# Patient Record
Sex: MALE | Race: WHITE
[De-identification: names, ages, dates, MRNs, and addresses within clinical notes are randomized per-mention and may not be internally consistent; named-entity substitution may affect disease eponyms.]

---

## 2017-02-23 NOTE — P.OP
Date of Procedure: 02/23/17


Preoperative Diagnosis: 


Status post a partial right nasectomy for cancer, absence of right side of nose


Postoperative Diagnosis: 


Same


Procedure(s) Performed: 


Nasal reconstruction with use of a paramedian forehead flap with upper lateral 

cartilaginous graft with complex closure of the donor site.


Anesthesia: GETA


Surgeon: Renato Quiroz


Estimated Blood Loss (ml): 50


Pathology: other (Nasal margin)


Condition: stable


Disposition: PACU


Indications for Procedure: 


This patient had a infiltrative basal cell carcinoma.  Initial removal showed 

perineural invasion.  Was sent to a Mohs surgeon for completion of removal.  

Reconstruction is planned.  All risks, benefits, and alternative therapies were 

discussed in detail.  Consent was obtained and all questions were answered.


Operative Findings: 


Patient had a very large right nasal defect including the right side of the 

nose columella and alar rim.


Description of Procedure: 


This patient was taken to the operative room and placed in the supine position.

  A general inhalation anesthetic was administered to the patient by mask and 

subsequently intubated with a cuffed endotracheal tube by the department of 

anesthesia with a functioning IV line in place.  Patient was monitored 

throughout the entire case by the department of anesthesia.  The face and nose 

was sterilely prepped and draped in usual fashion.  We identified a very large 

right nasal defect where the entire right side of the nose was missing.  We've 

injected the nose with lidocaine 1% with epinephrine 1 100,000 did markings.  

We with the use of a dermoid blade freshen the margins to accept the graft.  We 

then did measurements and developed a template for nasal reconstruction.  The 

nasal reconstruction was planned with a paramedian forehead flap.  We utilized 

a Doppler to identify the supratrochlear artery.  We marked this area.  We 

harvested cartilage from the septum and use that as an upper lateral cartilage 

insert.  We elected to hold off on a rim caudal edge of this graft until after 

this is healed.  We then measured the forehead and scalp flap and used to 

template which extended into the scalp.  We developed the paramedian flap with 

use of a 15 blade delicate plastic scissors and a Brown-Adson forceps and we 

rotated the paramedian flap into position and measured this appropriately.  We 

had good blood flow and utilized a Doppler to make sure the pedicle flap was 

viable.  We did extensive undermining of the donor site and we close the donor 

site in a complex fashion utilizing 2-0 Vicryl deeply 4-0 Monocryl in the deep 

dermal layer and the skin was closed with use of a 5-0 nylon.  After closure of 

this donor site in a complex fashion we rotated the pedicle flap into position 

we thinned out the lower portion and rotated the lower portion upon itself to 

form the LR rim.  We sutured the flap into position utilizing 4-0 Monocryl 

deeply.  We then closed the skin with a 4-0 nylon in an interrupted type 

fashion.  Excellent approximation was obtained we obtained an excellent 

cosmetic result with regard LR rim reconstruction.  We utilized the cartilage 

that we previously harvested for the upper lateral cartilage for form and 

function.  The patient tolerated this well and follow-up will be in the office 

in 1 week.

## 2017-06-01 NOTE — P.OP
Date of Procedure: 06/01/17


Preoperative Diagnosis: 


Forehead and nose pedicle flap for nasal reconstruction after cancer removal.


Postoperative Diagnosis: 


Same


Procedure(s) Performed: 


Excision of a forehead topedicle flap with a complex closure of a 2.6 x 2 cm 

forehead defect to complex closure of a nasal defect measuring 2.7 x 1 cm with 

a debulking of the right alar rim with a right auricular cartilage graft and a 

complex closure of a right auricular donor site measuring 2.6 x 1 cm


Implants: 





Anesthesia: JEFFA


Surgeon: Renato Quiroz


Estimated Blood Loss (ml): 10


Pathology: none sent


Condition: stable


Disposition: PACU


Indications for Procedure: 


Patient is a very large right nasal cancer and underwent reconstruction with 

use of a forehead flap.  He seen today for pedicle takedown.  We are also 

planning on a right auricular cartilage graft to reconstruct the right alar rim 

along with a debulking.


Operative Findings: 


Patient had a existing pedicle flap that was taken down and the right nasal 

alar rim incision underwent reconstruction.


Description of Procedure: 


This patient was taken to the operative room and placed in the supine position.

  A general inhalation anesthetic was administered to the patient by mask and 

subsequently intubated with a cuffed endotracheal tube by the department of 

anesthesia with a functioning IV line in place.  The patient was monitored 

throughout the entire case by the department of anesthesia.  The face and right 

ear and neck were sterilely prepped and draped in usual fashion.  We marked the 

pedicle flap both on the forehead and around the nose along with a rim incision 

to the right maye bowl.  We anesthetize these areas with lidocaine 1% with 

epinephrine 1 100,000 and Marcaine.  After 10 minutes were allowed wait for 

full vasoconstrictive effects to take place an incision was made in the 

forehead and the pedicle flap was  from the forehead leaving a large 

defect.  We excised this defect and close this after extensive undermining in 

all directions we did undermining in all directions widely removed redundant 

skin and close this in a complex fashion the incision measured 2.6 x 2 cm.  We 

closed this with the 4-0 Monocryl in the deep subcutaneous tissue 4-0 Monocryl 

in the deep dermal layer 4-0 Monocryl and the mid dermal layer and the skin was 

closed with a 5-0 Prolene.  The pedicle flap was then excised from the nose 

leaving a large defect measuring 2.7 x 1 cm.  This defect underwent a debulking 

with removal of redundant skin we did extensive undermining in all directions 

and we repaired this defect with complex closure utilizing a 4-0 Monocryl in 

the deep subcutaneous fashion for Monocryl and the mid subcutaneous fashion and 

the skin was closed with a 5-0 Prolene.  After the nasal defect was excised we 

made an incision along the right alar rim and debulked the right alar rim.  We 

then made an incision in the auricular maye bowl and harvested the skin from 

the maye bowl.  We closed the donor site in a complex fashion with extensive 

undermining and we utilized a 4-0 Monocryl the deep subcutaneous tissue 4-0 

Monocryl in the deep dermal layer and the skin was closed with a 50 rapid 

Vicryl in a running nonlocking fashion that incision and defect site measured 

2.6 x 1 cm.  After the donor site was closed in a complex fashion the cartilage 

was cut to size and placed as a alar rim graft.  We sutured it into place.  

Again we did a debulking of the right alar rim which was quite bulky.  We then 

that rim and we close that defect in a complex fashion again with a 4-0 

Monocryl deeply and a 5-0 Prolene after we did undermining and a graft 

placement and we secured the graft with a 4-0 Monocryl.  Excellent 

approximation was obtained excellent results were obtained the nasal defects 

looked good.  To summarize we did an excision of a forehead and nose pedicle 

flap with complex closure with a defect measuring 2.6 x 2 cm of the forehead we 

then did a complex closure of a nasal defect measuring 2.1 x 1 cm with a 

debulking of the right alar rim is a right auricular cartilage graft and 

complex closure of the right auricular donor site measuring 2.6 x 1 cm.  The 

patient tolerated this well.  Patient will be discharged with nitroglycerin 

ointment Norco antibiotics and a follow-up is scheduled for 1 week.  The 

patient did have a Merocel sponge pack placed and he will remove that in 1 day.

## 2018-07-03 ENCOUNTER — HOSPITAL ENCOUNTER (INPATIENT)
Dept: HOSPITAL 47 - EC | Age: 43
LOS: 1 days | Discharge: LEFT BEFORE DISCHARGE | DRG: 872 | End: 2018-07-04
Attending: INTERNAL MEDICINE | Admitting: INTERNAL MEDICINE
Payer: COMMERCIAL

## 2018-07-03 VITALS — TEMPERATURE: 99.7 F

## 2018-07-03 DIAGNOSIS — M19.90: ICD-10-CM

## 2018-07-03 DIAGNOSIS — I10: ICD-10-CM

## 2018-07-03 DIAGNOSIS — F17.200: ICD-10-CM

## 2018-07-03 DIAGNOSIS — A41.9: Primary | ICD-10-CM

## 2018-07-03 DIAGNOSIS — K21.9: ICD-10-CM

## 2018-07-03 DIAGNOSIS — K57.32: ICD-10-CM

## 2018-07-03 DIAGNOSIS — Z85.828: ICD-10-CM

## 2018-07-03 DIAGNOSIS — F41.9: ICD-10-CM

## 2018-07-03 DIAGNOSIS — Z90.49: ICD-10-CM

## 2018-07-03 LAB
ALBUMIN SERPL-MCNC: 4.4 G/DL (ref 3.5–5)
ALP SERPL-CCNC: 114 U/L (ref 38–126)
ALT SERPL-CCNC: 65 U/L (ref 21–72)
AMYLASE SERPL-CCNC: 60 U/L (ref 30–110)
ANION GAP SERPL CALC-SCNC: 15 MMOL/L
AST SERPL-CCNC: 60 U/L (ref 17–59)
BASOPHILS # BLD AUTO: 0.1 K/UL (ref 0–0.2)
BASOPHILS NFR BLD AUTO: 1 %
BUN SERPL-SCNC: 7 MG/DL (ref 9–20)
CALCIUM SPEC-MCNC: 9.5 MG/DL (ref 8.4–10.2)
CHLORIDE SERPL-SCNC: 102 MMOL/L (ref 98–107)
CO2 SERPL-SCNC: 24 MMOL/L (ref 22–30)
EOSINOPHIL # BLD AUTO: 0.1 K/UL (ref 0–0.7)
EOSINOPHIL NFR BLD AUTO: 0 %
ERYTHROCYTE [DISTWIDTH] IN BLOOD BY AUTOMATED COUNT: 4.51 M/UL (ref 4.3–5.9)
ERYTHROCYTE [DISTWIDTH] IN BLOOD: 12.5 % (ref 11.5–15.5)
GGT SERPL-CCNC: 314 U/L (ref 15–73)
GLUCOSE SERPL-MCNC: 109 MG/DL (ref 74–99)
HCT VFR BLD AUTO: 43.2 % (ref 39–53)
HGB BLD-MCNC: 14.9 GM/DL (ref 13–17.5)
LIPASE SERPL-CCNC: 91 U/L (ref 23–300)
LYMPHOCYTES # SPEC AUTO: 1.6 K/UL (ref 1–4.8)
LYMPHOCYTES NFR SPEC AUTO: 11 %
MCH RBC QN AUTO: 33 PG (ref 25–35)
MCHC RBC AUTO-ENTMCNC: 34.5 G/DL (ref 31–37)
MCV RBC AUTO: 95.6 FL (ref 80–100)
MONOCYTES # BLD AUTO: 0.9 K/UL (ref 0–1)
MONOCYTES NFR BLD AUTO: 6 %
NEUTROPHILS # BLD AUTO: 11.9 K/UL (ref 1.3–7.7)
NEUTROPHILS NFR BLD AUTO: 81 %
PLATELET # BLD AUTO: 242 K/UL (ref 150–450)
POTASSIUM SERPL-SCNC: 3.5 MMOL/L (ref 3.5–5.1)
PROT SERPL-MCNC: 7.4 G/DL (ref 6.3–8.2)
SODIUM SERPL-SCNC: 141 MMOL/L (ref 137–145)
WBC # BLD AUTO: 14.8 K/UL (ref 3.8–10.6)

## 2018-07-03 PROCEDURE — 83690 ASSAY OF LIPASE: CPT

## 2018-07-03 PROCEDURE — 74177 CT ABD & PELVIS W/CONTRAST: CPT

## 2018-07-03 PROCEDURE — 85025 COMPLETE CBC W/AUTO DIFF WBC: CPT

## 2018-07-03 PROCEDURE — 83605 ASSAY OF LACTIC ACID: CPT

## 2018-07-03 PROCEDURE — 82977 ASSAY OF GGT: CPT

## 2018-07-03 PROCEDURE — 81003 URINALYSIS AUTO W/O SCOPE: CPT

## 2018-07-03 PROCEDURE — 36415 COLL VENOUS BLD VENIPUNCTURE: CPT

## 2018-07-03 PROCEDURE — 80053 COMPREHEN METABOLIC PANEL: CPT

## 2018-07-03 PROCEDURE — 99285 EMERGENCY DEPT VISIT HI MDM: CPT

## 2018-07-03 PROCEDURE — 82150 ASSAY OF AMYLASE: CPT

## 2018-07-03 PROCEDURE — 87040 BLOOD CULTURE FOR BACTERIA: CPT

## 2018-07-03 RX ADMIN — DEXTROSE MONOHYDRATE STA MLS/HR: 5 INJECTION, SOLUTION INTRAVENOUS at 19:57

## 2018-07-03 RX ADMIN — HYDROCODONE BITARTRATE AND ACETAMINOPHEN PRN EACH: 5; 325 TABLET ORAL at 22:11

## 2018-07-03 RX ADMIN — HEPARIN SODIUM SCH UNIT: 5000 INJECTION, SOLUTION INTRAVENOUS; SUBCUTANEOUS at 23:06

## 2018-07-03 RX ADMIN — DEXTROSE MONOHYDRATE STA: 5 INJECTION, SOLUTION INTRAVENOUS at 19:40

## 2018-07-03 RX ADMIN — METRONIDAZOLE SCH MLS/HR: 500 INJECTION, SOLUTION INTRAVENOUS at 23:06

## 2018-07-03 RX ADMIN — HYDROCODONE BITARTRATE AND ACETAMINOPHEN PRN EACH: 5; 325 TABLET ORAL at 18:07

## 2018-07-03 RX ADMIN — MORPHINE SULFATE PRN MG: 2 INJECTION, SOLUTION INTRAMUSCULAR; INTRAVENOUS at 23:02

## 2018-07-03 NOTE — CT
EXAMINATION TYPE: CT abdomen pelvis w con

 

DATE OF EXAM: 7/3/2018

 

COMPARISON: NONE

 

HISTORY: LLQ pain, diarrhea, vomiting

 

CT DLP: 1283 mGycm

Automated exposure control for dose reduction was used.

 

CONTRAST: 

CT scan of the abdomen pelvis is performed with IV Contrast, patient injected with 100 mL of Isovue 3
00.

 

FINDINGS- 

LUNG BASES-  No significant abnormality is appreciated. 

 

LIVER/GB-surgical clips in the gallbladder fossa noted. Liver appears to be prominent in size measuri
ng 22 cm there is findings suggestive of hepatic steatosis. Mild central biliary prominence of the du
ct. Postcholecystectomy changes seen. 

 

PANCREAS-  No gross abnormality is seen. 

 

SPLEEN-  No gross abnormality is seen. 

 

ADRENALS-  No gross abnormality is seen.

 

KIDNEYS/BLADDER- no hydronephrosis nephrolithiasis or renal mass.

   

BOWEL-there is diffuse bowel wall thickening involving the left colon with diverticula in the region 
is small amount of free fluid. No diagnostic evidence of free air.

LYMPH NODES-  No greater than 1cm abdominal or pelvic lymph nodes areappreciated. 

 

OSSEOUS STRUCTURES-  No significant abnormality is seen. 

 

OTHER-  aorta of normal caliber. 

 

IMPRESSION- 

1. Acute diverticulitis with a small amount of adjacent free fluid but no evidence of free air or abs
cess.

## 2018-07-03 NOTE — ED
General Adult HPI





- General


Chief complaint: Abdominal Pain


Stated complaint: LUQ Pain


Time Seen by Provider: 07/03/18 11:30


Source: patient, RN notes reviewed


Mode of arrival: ambulatory


Limitations: no limitations





- History of Present Illness


Initial comments: 





This is a 42-year-old male who presents emergency Department complaining of 

left lower quadrant pain for last 2 or 3 days.  Patient states he has vomited 

and was nauseous earlier but is no longer nauseated.  Patient denies any 

diarrhea.  Patient states the pain is limited to the left lower quadrant.  

Patient denies any other areas of abdominal pain.  Patient denies any chest 

pain or difficulty breathing.  Patient denies any headache patient denies 

numbness weakness.  Patient denies any recent fever chills.  Patient denies any 

back pain.  Patient denies dysuria hematuria urinary frequency.





- Related Data


 Home Medications











 Medication  Instructions  Recorded  Confirmed


 


No Known Home Medications  07/03/18 07/03/18











 Allergies











Allergy/AdvReac Type Severity Reaction Status Date / Time


 


No Known Allergies Allergy   Verified 07/03/18 12:50














Review of Systems


ROS Statement: 


Those systems with pertinent positive or pertinent negative responses have been 

documented in the HPI.





ROS Other: All systems not noted in ROS Statement are negative.





Past Medical History


Past Medical History: Cancer, GERD/Reflux, Hypertension


Additional Past Medical History / Comment(s): skin cancer on nose , 

diverticulitis,


History of Any Multi-Drug Resistant Organisms: None Reported


Past Surgical History: Cholecystectomy, Orthopedic Surgery


Additional Past Surgical History / Comment(s): LEFT WRIST surgery, "surgery to 

remove cyst from my stomach", nasal surgery


Past Anesthesia/Blood Transfusion Reactions: No Reported Reaction


Past Psychological History: Anxiety


Smoking Status: Current every day smoker


Past Alcohol Use History: None Reported


Past Drug Use History: None Reported





- Past Family History


  ** Mother


Family Medical History: No Reported History





General Exam





- General Exam Comments


Initial Comments: 





GENERAL:


Patient is well-developed and well-nourished.  Patient is nontoxic and well-

hydrated and is in mild distress.





ENT:


Neck is soft and supple.  No significant lymphadenopathy is noted.  Oropharynx 

is clear.  Moist mucous membranes.  Neck has full range of motion without 

eliciting any pain.





EYES:


The sclera were anicteric and conjunctiva were pink and moist.  Extraocular 

movements were intact and pupils were equal round and reactive to light.  

Eyelids were unremarkable.





PULMONARY:


Unlabored respirations.  Good breath sounds bilaterally.  No audible rales 

rhonchi or wheezing was noted.





CARDIOVASCULAR:


There is a regular rate and rhythm without any murmurs gallops or rubs.  





ABDOMEN:


Left lower quadrant pain point tenderness no rebound.  No palpable organomegaly 

was noted.  There is no palpable pulsatile mass.





SKIN:


Skin is clear with no lesions or rashes and otherwise unremarkable.





NEUROLOGIC:


Patient is alert and oriented x3.  Cranial nerves II through XII are grossly 

intact.  Motor and sensory are also intact.  Normal speech, volume and content.

  Symmetrical smile. 





MUSCULOSKELETAL:


Normal extremities with adequate strength and full range of motion.  No lower 

extremity swelling or edema.  No calf tenderness.





LYMPHATICS:


No significant lymphadenopathy is noted





PSYCHIATRIC:


Normal psychiatric evaluation.  Normal interpersonal interactions appears 

functionally intact in deals appropriately with others.  No signs of 

depression.  No signs of anxiety.  


Limitations: no limitations





Course


 Vital Signs











  07/03/18





  12:15


 


Temperature 100.3 F H


 


Pulse Rate 127 H


 


Respiratory 16





Rate 


 


Blood Pressure 200/118


 


O2 Sat by Pulse 97





Oximetry 














Medical Decision Making





- Medical Decision Making





Computed tomography scan shows acute diverticulitis with some surrounding free 

fluid no free air no abscess.





I spoke with Dr. Gonzalez he accepted the patient admitted the patient wrote 

admitting orders I started antibiotics and continue antibiotics on the floor.





- Lab Data


Result diagrams: 


 07/03/18 13:50





 07/03/18 13:50


 Lab Results











  07/03/18 07/03/18 07/03/18 Range/Units





  13:50 13:50 13:50 


 


WBC  14.8 H    (3.8-10.6)  k/uL


 


RBC  4.51    (4.30-5.90)  m/uL


 


Hgb  14.9    (13.0-17.5)  gm/dL


 


Hct  43.2    (39.0-53.0)  %


 


MCV  95.6    (80.0-100.0)  fL


 


MCH  33.0    (25.0-35.0)  pg


 


MCHC  34.5    (31.0-37.0)  g/dL


 


RDW  12.5    (11.5-15.5)  %


 


Plt Count  242    (150-450)  k/uL


 


Neutrophils %  81    %


 


Lymphocytes %  11    %


 


Monocytes %  6    %


 


Eosinophils %  0    %


 


Basophils %  1    %


 


Neutrophils #  11.9 H    (1.3-7.7)  k/uL


 


Lymphocytes #  1.6    (1.0-4.8)  k/uL


 


Monocytes #  0.9    (0-1.0)  k/uL


 


Eosinophils #  0.1    (0-0.7)  k/uL


 


Basophils #  0.1    (0-0.2)  k/uL


 


Sodium   141   (137-145)  mmol/L


 


Potassium   3.5   (3.5-5.1)  mmol/L


 


Chloride   102   ()  mmol/L


 


Carbon Dioxide   24   (22-30)  mmol/L


 


Anion Gap   15   mmol/L


 


BUN   7 L   (9-20)  mg/dL


 


Creatinine   0.80   (0.66-1.25)  mg/dL


 


Est GFR (CKD-EPI)AfAm   >90   (>60 ml/min/1.73 sqM)  


 


Est GFR (CKD-EPI)NonAf   >90   (>60 ml/min/1.73 sqM)  


 


Glucose   109 H   (74-99)  mg/dL


 


Plasma Lactic Acid Nelson    1.4  (0.7-2.0)  mmol/L


 


Calcium   9.5   (8.4-10.2)  mg/dL


 


Total Bilirubin   1.1   (0.2-1.3)  mg/dL


 


GGT   314 H   (15-73)  U/L


 


AST   60 H   (17-59)  U/L


 


ALT   65   (21-72)  U/L


 


Alkaline Phosphatase   114   ()  U/L


 


Total Protein   7.4   (6.3-8.2)  g/dL


 


Albumin   4.4   (3.5-5.0)  g/dL


 


Amylase   60   ()  U/L


 


Lipase   91   ()  U/L














Disposition


Clinical Impression: 


 Diverticulitis





Disposition: ADMITTED AS IP TO THIS [facility]


Referrals: 


None,Stated [Primary Care Provider] - 1-2 days


Time of Disposition: 15:29

## 2018-07-03 NOTE — P.HPIM
History of Present Illness


H&P Date: 07/03/18


Chief Complaint: Abdominal pain





Patient is a 42-year-old male with a known history of hypertension, GERD, 

osteoarthritis and previous history of diagnostic laparoscopic drainage of 

abscess due to perforated diverticula came to ER with complaints of left lower 

quadrant pain for last 2 or 3 days.  Patient states he has vomited and was 

nauseous earlier but is no longer nauseated.  Last night he also felt cold 

sweats.  Patient denies any diarrhea.  Patient states the pain is limited to 

the left lower quadrant.  Patient denies any other areas of abdominal pain.  

Patient denies any chest pain or difficulty breathing.  Patient denies any 

headache patient denies numbness weakness.  Patient denies any recent fever 

chills.  Patient denies any back pain.  Patient denies dysuria hematuria 

urinary frequency.





CT chest showed acute diverticulitis with small amount of adjacent free fluid 

but no evidence of free air or abscess.





Review of Systems





Constitutional: Patient denies any fever or chills .  No generalized weakness 

or weight loss.  


Abdomen: Patient does have left lower quadrant abdominal pain along with nausea 

and 1 episode of vomiting.  No diarrhea.


Cardiovascular: Patient denies any chest pain or short of breath no 

palpitations.


Respiratory: patient denied any cough is from production.  No shortness of 

breath


Neurologic: Patient denied any numbness or tingling headache.


Musculoskeletal: Patient denies any complaints of joint swelling or deformity.


Skin: Negative


Psychiatric: Negative


Endocrine: No heat or cold intolerance.  No recent weight gain.


Genitourinary: No dysuria or hematuria.


All other 14 point ROS negative except the above





Past Medical History


Past Medical History: Cancer, GERD/Reflux, Hypertension, Osteoarthritis (OA)


Additional Past Medical History / Comment(s): skin cancer on nose , 

diverticulitis(past perforation-2016),past broken lt wrist(has pins in place)


History of Any Multi-Drug Resistant Organisms: None Reported


Past Surgical History: Cholecystectomy, Orthopedic Surgery


Additional Past Surgical History / Comment(s): LEFT WRIST pins in place , x3 sx 

for basl cell skin cancer rt nasal/cheek w/ skin flap,  4-17-16 diagnostic 

laproscopic and drainage of abcess d/t perforated diverticula


Past Anesthesia/Blood Transfusion Reactions: No Reported Reaction


Additional Past Anesthesia/Blood Transfusion Reaction / Comment(s): has never 

recieved blood


Smoking Status: Current every day smoker





- Past Family History


  ** Mother


Family Medical History: No Reported History


Additional Family Medical History / Comment(s): pt was raised by his 

grandmother does'nt know anything about his mom or dad





Medications and Allergies


 Home Medications











 Medication  Instructions  Recorded  Confirmed  Type


 


No Known Home Medications  07/03/18 07/03/18 History











 Allergies











Allergy/AdvReac Type Severity Reaction Status Date / Time


 


levofloxacin [From Levaquin] Allergy  Rash/Hives Verified 07/03/18 21:43














Physical Exam


Vitals: 


 Vital Signs











  Temp Pulse Resp BP Pulse Ox


 


 07/03/18 12:15  100.3 F H  127 H  16  200/118  97








 Intake and Output











 07/03/18 07/03/18 07/03/18





 06:59 14:59 22:59


 


Other:   


 


  Weight  79.379 kg 














PHYSICAL EXAMINATION: 


Patient is lying in the bed comfortably, no acute distress, awake alert and 

oriented.. 


HEENT: Normocephalic. Neck is supple. Pupils reactive. Nostrils clear. Oral 

cavity is moist. Ears reveal no drainage. 


Neck reveals no JVD, carotid bruits, or thyromegaly. 


CHEST EXAMINATION: Trachea is central. Symmetrical expansion. Lung fields clear 

to auscultation and percussion. 


CARDIAC: Normal S1, S2 with no gallops. No murmurs 


ABDOMEN: Soft.  Left lower quadrant tenderness.  No guarding or rigidity.  

Bowel sounds normal. No organomegaly. No abdominal bruits. 


Extremities: reveal no edema.  No clubbing or cyanosis


Neurologically awake, alert, oriented x3 with well-coordinated movements.  No 

focal deficits noted


Skin: No rash or skin lesions. 


Psychiatric: Coperative.  Nonsuicidal


Musculoskeletal: No joint swelling or deformity.  Normal range of motion.








Results


CBC & Chem 7: 


 07/03/18 13:50





 07/03/18 13:50


Labs: 


 Abnormal Lab Results - Last 24 Hours (Table)











  07/03/18 07/03/18 Range/Units





  13:50 13:50 


 


WBC  14.8 H   (3.8-10.6)  k/uL


 


Neutrophils #  11.9 H   (1.3-7.7)  k/uL


 


BUN   7 L  (9-20)  mg/dL


 


Glucose   109 H  (74-99)  mg/dL


 


GGT   314 H  (15-73)  U/L


 


AST   60 H  (17-59)  U/L














Thrombosis Risk Factor Assmnt





- DVT/VTE Prophylaxis


DVT/VTE Prophylaxis: Pharmacologic Prophylaxis ordered





Assessment and Plan


Assessment: 





Left lower quadrant abdominal pain secondary to Acute diverticulitis


History of laparoscopic drainage of perforated diverticula


Hypertension


GERD


Osteoarthritis


Basilar cell cancer on nose status post surgery with skin flap


DVT prophylaxis





Plan:


Patient will be continued on antibiotics in the form of Zosyn and 

metronidazole.  Continue the pain medications in the form of Norco and Dilaudid 

when necessary.  Patient will be started on clear liquid diet and advance as 

tolerated.  We will follow up closely and further recommendations based on the 

clinical course.





Time with Patient: Greater than 30

## 2018-07-04 VITALS — HEART RATE: 81 BPM | RESPIRATION RATE: 18 BRPM | SYSTOLIC BLOOD PRESSURE: 161 MMHG | DIASTOLIC BLOOD PRESSURE: 90 MMHG

## 2018-07-04 LAB
ALBUMIN SERPL-MCNC: 3.7 G/DL (ref 3.5–5)
ALP SERPL-CCNC: 95 U/L (ref 38–126)
ALT SERPL-CCNC: 49 U/L (ref 21–72)
ANION GAP SERPL CALC-SCNC: 11 MMOL/L
AST SERPL-CCNC: 40 U/L (ref 17–59)
BASOPHILS # BLD AUTO: 0.1 K/UL (ref 0–0.2)
BASOPHILS NFR BLD AUTO: 1 %
BUN SERPL-SCNC: 9 MG/DL (ref 9–20)
CALCIUM SPEC-MCNC: 8.7 MG/DL (ref 8.4–10.2)
CHLORIDE SERPL-SCNC: 104 MMOL/L (ref 98–107)
CO2 SERPL-SCNC: 25 MMOL/L (ref 22–30)
EOSINOPHIL # BLD AUTO: 0.1 K/UL (ref 0–0.7)
EOSINOPHIL NFR BLD AUTO: 2 %
ERYTHROCYTE [DISTWIDTH] IN BLOOD BY AUTOMATED COUNT: 4 M/UL (ref 4.3–5.9)
ERYTHROCYTE [DISTWIDTH] IN BLOOD: 12.3 % (ref 11.5–15.5)
GLUCOSE SERPL-MCNC: 104 MG/DL (ref 74–99)
HCT VFR BLD AUTO: 39.5 % (ref 39–53)
HGB BLD-MCNC: 13.6 GM/DL (ref 13–17.5)
KETONES UR QL STRIP.AUTO: (no result)
LYMPHOCYTES # SPEC AUTO: 1.4 K/UL (ref 1–4.8)
LYMPHOCYTES NFR SPEC AUTO: 17 %
MCH RBC QN AUTO: 34 PG (ref 25–35)
MCHC RBC AUTO-ENTMCNC: 34.5 G/DL (ref 31–37)
MCV RBC AUTO: 98.6 FL (ref 80–100)
MONOCYTES # BLD AUTO: 0.4 K/UL (ref 0–1)
MONOCYTES NFR BLD AUTO: 5 %
NEUTROPHILS # BLD AUTO: 5.9 K/UL (ref 1.3–7.7)
NEUTROPHILS NFR BLD AUTO: 74 %
PH UR: 6 [PH] (ref 5–8)
PLATELET # BLD AUTO: 211 K/UL (ref 150–450)
POTASSIUM SERPL-SCNC: 3.2 MMOL/L (ref 3.5–5.1)
PROT SERPL-MCNC: 6.3 G/DL (ref 6.3–8.2)
SODIUM SERPL-SCNC: 140 MMOL/L (ref 137–145)
SP GR UR: 1.02 (ref 1–1.03)
UROBILINOGEN UR QL STRIP: 3 MG/DL (ref ?–2)
WBC # BLD AUTO: 8 K/UL (ref 3.8–10.6)

## 2018-07-04 RX ADMIN — DEXTROSE MONOHYDRATE SCH MLS/HR: 5 INJECTION, SOLUTION INTRAVENOUS at 04:15

## 2018-07-04 RX ADMIN — METRONIDAZOLE SCH MLS/HR: 500 INJECTION, SOLUTION INTRAVENOUS at 05:46

## 2018-07-04 RX ADMIN — HEPARIN SODIUM SCH UNIT: 5000 INJECTION, SOLUTION INTRAVENOUS; SUBCUTANEOUS at 07:35

## 2018-07-04 RX ADMIN — DEXTROSE MONOHYDRATE SCH: 5 INJECTION, SOLUTION INTRAVENOUS at 12:41

## 2018-07-04 RX ADMIN — MORPHINE SULFATE PRN MG: 2 INJECTION, SOLUTION INTRAMUSCULAR; INTRAVENOUS at 05:48

## 2018-07-04 RX ADMIN — METRONIDAZOLE SCH: 500 INJECTION, SOLUTION INTRAVENOUS at 12:41

## 2018-07-04 RX ADMIN — HYDROCODONE BITARTRATE AND ACETAMINOPHEN PRN EACH: 5; 325 TABLET ORAL at 06:39

## 2023-05-12 ENCOUNTER — HOSPITAL ENCOUNTER (INPATIENT)
Dept: HOSPITAL 47 - EC | Age: 48
LOS: 1 days | Discharge: LEFT BEFORE DISCHARGE | DRG: 282 | End: 2023-05-13
Attending: STUDENT IN AN ORGANIZED HEALTH CARE EDUCATION/TRAINING PROGRAM | Admitting: STUDENT IN AN ORGANIZED HEALTH CARE EDUCATION/TRAINING PROGRAM
Payer: COMMERCIAL

## 2023-05-12 DIAGNOSIS — Z28.310: ICD-10-CM

## 2023-05-12 DIAGNOSIS — I10: ICD-10-CM

## 2023-05-12 DIAGNOSIS — K85.90: Primary | ICD-10-CM

## 2023-05-12 DIAGNOSIS — E87.6: ICD-10-CM

## 2023-05-12 DIAGNOSIS — Z87.19: ICD-10-CM

## 2023-05-12 DIAGNOSIS — D69.59: ICD-10-CM

## 2023-05-12 DIAGNOSIS — K21.9: ICD-10-CM

## 2023-05-12 DIAGNOSIS — N20.0: ICD-10-CM

## 2023-05-12 DIAGNOSIS — E83.42: ICD-10-CM

## 2023-05-12 DIAGNOSIS — Z88.1: ICD-10-CM

## 2023-05-12 DIAGNOSIS — M19.90: ICD-10-CM

## 2023-05-12 DIAGNOSIS — R77.8: ICD-10-CM

## 2023-05-12 DIAGNOSIS — F10.239: ICD-10-CM

## 2023-05-12 DIAGNOSIS — R74.01: ICD-10-CM

## 2023-05-12 DIAGNOSIS — D68.9: ICD-10-CM

## 2023-05-12 DIAGNOSIS — F10.229: ICD-10-CM

## 2023-05-12 DIAGNOSIS — Z53.29: ICD-10-CM

## 2023-05-12 DIAGNOSIS — F17.200: ICD-10-CM
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
## 2023-05-12 DIAGNOSIS — Z85.828: ICD-10-CM

## 2023-05-12 LAB
ALBUMIN SERPL-MCNC: 3.2 G/DL (ref 3.5–5)
ALP SERPL-CCNC: 257 U/L (ref 38–126)
ALT SERPL-CCNC: 112 U/L (ref 4–49)
AMYLASE SERPL-CCNC: 97 U/L (ref 30–110)
ANION GAP SERPL CALC-SCNC: 14 MMOL/L
APTT BLD: 31 SEC (ref 22–30)
AST SERPL-CCNC: 493 U/L (ref 17–59)
BASOPHILS # BLD AUTO: 0.1 K/UL (ref 0–0.2)
BASOPHILS NFR BLD AUTO: 1 %
BILIRUB INDIRECT SERPL-MCNC: 2.1 MG/DL (ref 0–1.1)
BILIRUBIN DIRECT+TOT PNL SERPL-MCNC: 1.8 MG/DL (ref 0–0.2)
BUN SERPL-SCNC: 5 MG/DL (ref 9–20)
CALCIUM SPEC-MCNC: 7.6 MG/DL (ref 8.4–10.2)
CHLORIDE SERPL-SCNC: 89 MMOL/L (ref 98–107)
CO2 SERPL-SCNC: 31 MMOL/L (ref 22–30)
EOSINOPHIL # BLD AUTO: 0.1 K/UL (ref 0–0.7)
EOSINOPHIL NFR BLD AUTO: 1 %
ERYTHROCYTE [DISTWIDTH] IN BLOOD BY AUTOMATED COUNT: 3.98 M/UL (ref 4.3–5.9)
ERYTHROCYTE [DISTWIDTH] IN BLOOD: 13.7 % (ref 11.5–15.5)
GLUCOSE SERPL-MCNC: 118 MG/DL (ref 74–99)
HCT VFR BLD AUTO: 39.7 % (ref 39–53)
HGB BLD-MCNC: 13.8 GM/DL (ref 13–17.5)
INR PPP: 1.5 (ref ?–1.2)
LIPASE SERPL-CCNC: 713 U/L (ref 23–300)
LYMPHOCYTES # SPEC AUTO: 1.5 K/UL (ref 1–4.8)
LYMPHOCYTES NFR SPEC AUTO: 22 %
MAGNESIUM SPEC-SCNC: 1.8 MG/DL (ref 1.6–2.3)
MCH RBC QN AUTO: 34.6 PG (ref 25–35)
MCHC RBC AUTO-ENTMCNC: 34.7 G/DL (ref 31–37)
MCV RBC AUTO: 99.6 FL (ref 80–100)
MONOCYTES # BLD AUTO: 0.4 K/UL (ref 0–1)
MONOCYTES NFR BLD AUTO: 6 %
NEUTROPHILS # BLD AUTO: 4.4 K/UL (ref 1.3–7.7)
NEUTROPHILS NFR BLD AUTO: 66 %
PH UR: 6.5 [PH] (ref 5–8)
PLATELET # BLD AUTO: 95 K/UL (ref 150–450)
POTASSIUM SERPL-SCNC: 2 MMOL/L (ref 3.5–5.1)
POTASSIUM SERPL-SCNC: 3 MMOL/L (ref 3.5–5.1)
PROT SERPL-MCNC: 8.4 G/DL (ref 6.3–8.2)
PT BLD: 15.5 SEC (ref 9–12)
SODIUM SERPL-SCNC: 134 MMOL/L (ref 137–145)
SP GR UR: 1.02 (ref 1–1.03)
UROBILINOGEN UR QL STRIP: 3 MG/DL (ref ?–2)
WBC # BLD AUTO: 6.6 K/UL (ref 3.8–10.6)

## 2023-05-12 PROCEDURE — 85730 THROMBOPLASTIN TIME PARTIAL: CPT

## 2023-05-12 PROCEDURE — 84132 ASSAY OF SERUM POTASSIUM: CPT

## 2023-05-12 PROCEDURE — 74177 CT ABD & PELVIS W/CONTRAST: CPT

## 2023-05-12 PROCEDURE — 85027 COMPLETE CBC AUTOMATED: CPT

## 2023-05-12 PROCEDURE — 80320 DRUG SCREEN QUANTALCOHOLS: CPT

## 2023-05-12 PROCEDURE — 96365 THER/PROPH/DIAG IV INF INIT: CPT

## 2023-05-12 PROCEDURE — 80053 COMPREHEN METABOLIC PANEL: CPT

## 2023-05-12 PROCEDURE — 80061 LIPID PANEL: CPT

## 2023-05-12 PROCEDURE — 82248 BILIRUBIN DIRECT: CPT

## 2023-05-12 PROCEDURE — 83690 ASSAY OF LIPASE: CPT

## 2023-05-12 PROCEDURE — 96366 THER/PROPH/DIAG IV INF ADDON: CPT

## 2023-05-12 PROCEDURE — 93005 ELECTROCARDIOGRAM TRACING: CPT

## 2023-05-12 PROCEDURE — 99285 EMERGENCY DEPT VISIT HI MDM: CPT

## 2023-05-12 PROCEDURE — 81003 URINALYSIS AUTO W/O SCOPE: CPT

## 2023-05-12 PROCEDURE — 83735 ASSAY OF MAGNESIUM: CPT

## 2023-05-12 PROCEDURE — 83615 LACTATE (LD) (LDH) ENZYME: CPT

## 2023-05-12 PROCEDURE — 82150 ASSAY OF AMYLASE: CPT

## 2023-05-12 PROCEDURE — 36415 COLL VENOUS BLD VENIPUNCTURE: CPT

## 2023-05-12 PROCEDURE — 71045 X-RAY EXAM CHEST 1 VIEW: CPT

## 2023-05-12 PROCEDURE — 85610 PROTHROMBIN TIME: CPT

## 2023-05-12 PROCEDURE — 96375 TX/PRO/DX INJ NEW DRUG ADDON: CPT

## 2023-05-12 PROCEDURE — 93306 TTE W/DOPPLER COMPLETE: CPT

## 2023-05-12 PROCEDURE — 85025 COMPLETE CBC W/AUTO DIFF WBC: CPT

## 2023-05-12 PROCEDURE — 96372 THER/PROPH/DIAG INJ SC/IM: CPT

## 2023-05-12 PROCEDURE — 84484 ASSAY OF TROPONIN QUANT: CPT

## 2023-05-12 PROCEDURE — 96368 THER/DIAG CONCURRENT INF: CPT

## 2023-05-12 PROCEDURE — 96361 HYDRATE IV INFUSION ADD-ON: CPT

## 2023-05-12 RX ADMIN — POTASSIUM CHLORIDE SCH MLS/HR: 7.46 INJECTION, SOLUTION INTRAVENOUS at 16:55

## 2023-05-12 RX ADMIN — HYDROMORPHONE HYDROCHLORIDE PRN MG: 1 INJECTION, SOLUTION INTRAMUSCULAR; INTRAVENOUS; SUBCUTANEOUS at 22:57

## 2023-05-12 RX ADMIN — CEFAZOLIN SCH MLS/HR: 330 INJECTION, POWDER, FOR SOLUTION INTRAMUSCULAR; INTRAVENOUS at 19:57

## 2023-05-12 RX ADMIN — CEFAZOLIN SCH: 330 INJECTION, POWDER, FOR SOLUTION INTRAMUSCULAR; INTRAVENOUS at 23:49

## 2023-05-12 RX ADMIN — MAGNESIUM SULFATE IN DEXTROSE SCH MLS/HR: 10 INJECTION, SOLUTION INTRAVENOUS at 09:32

## 2023-05-12 RX ADMIN — POTASSIUM CHLORIDE SCH MLS/HR: 7.46 INJECTION, SOLUTION INTRAVENOUS at 19:57

## 2023-05-12 RX ADMIN — MAGNESIUM SULFATE IN DEXTROSE SCH MLS/HR: 10 INJECTION, SOLUTION INTRAVENOUS at 07:02

## 2023-05-12 RX ADMIN — POTASSIUM CHLORIDE SCH MLS/HR: 7.46 INJECTION, SOLUTION INTRAVENOUS at 18:44

## 2023-05-12 RX ADMIN — POTASSIUM CHLORIDE SCH MLS/HR: 14.9 INJECTION, SOLUTION INTRAVENOUS at 07:04

## 2023-05-12 RX ADMIN — POTASSIUM CHLORIDE SCH MLS/HR: 7.46 INJECTION, SOLUTION INTRAVENOUS at 14:49

## 2023-05-12 RX ADMIN — HYDROMORPHONE HYDROCHLORIDE PRN MG: 1 INJECTION, SOLUTION INTRAMUSCULAR; INTRAVENOUS; SUBCUTANEOUS at 18:49

## 2023-05-12 RX ADMIN — POTASSIUM CHLORIDE SCH MLS/HR: 14.9 INJECTION, SOLUTION INTRAVENOUS at 09:33

## 2023-05-12 NOTE — XR
EXAMINATION TYPE: XR chest 1V

 

DATE OF EXAM: 5/12/2023 6:44 AM

 

COMPARISON: None

 

TECHNIQUE: XR chest 1V Frontal view of the chest.

 

CLINICAL INDICATION:Male, 47 years old with history of etoh, epigastric pain; 

 

FINDINGS: 

Lungs/Pleura: There is no evidence of pleural effusion, focal consolidation, or pneumothorax.  Elevat
ion of the right hemidiaphragm.

Pulmonary vascularity: Unremarkable.

Heart/mediastinum: Cardiomediastinal silhouette is unremarkable.

Musculoskeletal: No acute osseous pathology.

 

IMPRESSION: 

1.  No acute cardiopulmonary disease/process.

2.  Elevation of the right hemidiaphragm which can be seen with hemidiaphragmatic paresis. This can b
e further evaluated with fluoroscopic sniff test as clinically indicated.

## 2023-05-12 NOTE — CT
EXAMINATION TYPE: CT abdomen pelvis w con

CT DLP: 1147.1 mGycm, Automated exposure control for dose reduction was used.

 

DATE OF EXAM: 5/12/2023 7:26 AM

 

COMPARISON: CT abdomen pelvis most recent from 7/3/2010.

CLINICAL INDICATION:Male, 47 years old with history of pain, lipase; Abdominal pain x1 week.

 

TECHNIQUE:  Standard  CT of the abdomen and pelvis following the administration of 100 cc of Isovue 3
00 IV contrast material. Coronal and sagittal reformats were performed. 

 

FINDINGS: 

LOWER CHEST: The visualized lung bases are clear. Cardiomegaly. No pericardial effusion. Coronary art
erial calcifications.

 

ABDOMEN

LIVER: Diffusely heterogenous liver with scattered suggestive hypodense micronodules. The portal vein
 is patent. The hepatic veins appear patent. Recanalization the umbilical vein.

GALLBLADDER AND BILE DUCTS: The gallbladder is surgically absent.

PANCREAS: Minimal peripancreatic fluid.

SPLEEN: Unremarkable.

ADRENAL GLANDS: Unremarkable.

KIDNEYS AND URETERS: No evidence of hydronephrosis. Nonobstructive bilateral punctate 1 mm calculi. T
he kidneys enhance symmetrically without suspicious focal lesion.

 

PELVIS

BLADDER: Moderately distended.

REPRODUCTIVE: Unremarkable.

 

ABDOMEN & PELVIS

STOMACH AND BOWEL: Hyperdense material within the stomach and distal esophagus. Likely represents ing
ested contents. Sigmoid colon diverticulosis with wall thickening without surrounding inflammatory ch
anges. There is some wall thickening involving the descending colon extending into the hepatic flexur
e and also the terminal ileum. The appendix is within normal limits. No evidence of bowel obstruction
. 

PERITONEUM: No evidence of pneumoperitoneum and small volume ascites throughout the pelvis.

 

VASCULATURE: Mild atherosclerotic calcifications are present throughout the abdominal aorta and its b
ranches. No evidence of aortic aneurysm. 

MUSCULOSKELETAL: No acute osseous abnormalities

LYMPH NODES: No gross evidence for lymphadenopathy.

SOFT TISSUE/ABDOMINAL WALL: Unremarkable

 

IMPRESSION:

1.  Heterogenous appearance of the liver with suggestive scattered hypodense micronodules. Additional
ly there is small volume ascites and recanalization of the umbilical vein. Differential includes hepa
tic congestion versus Budd-Chiari syndrome versus underlying cirrhosis. Consider further evaluation w
ith MRI liver and correlation with liver function tests.

2. Scattered regions of circumferential wall thickening involving the sigmoid colon, ascending colon 
with extension into the hepatic flexure and the terminal ileum. Additional sigmoid diverticulosis dem
onstrated. Findings may related to nonspecific enterocolitis from an infectious or inflammatory etiol
ogy such as Crohn's disease versus portal hypertensive enterocolonic with the.

3. Nonobstructed bilateral renal calculi.

4. Trace peripancreatic fat stranding and fluid which may reactive to # 1 versus acute interstitial e
dematous pancreatitis. Correlation with lipase values is recommended.

## 2023-05-12 NOTE — ED
General Adult HPI





- General


Chief complaint: Abdominal Pain


Stated complaint: ETOH


Time Seen by Provider: 05/12/23 05:40


Source: patient, RN notes reviewed


Mode of arrival: wheelchair


Limitations: no limitations





- History of Present Illness


Initial comments: 





47-year-old male with a past medical history of hypertension, diverticulitis 

with perforation, GERD, chronic alcohol abuse, h/o cholecystectomy presents to 

the emergency room for abdominal pain.  Patient states for the past 5 days he 

has had upper abdominal pain.  He states he also has had nausea vomiting and 

hasn't been able to eat or drink much.  He has been having a little bit of 

diarrhea.  Last alcoholic drink sore this morning.  Patient does admit the pain 

radiates to his chest.  He denies any fevers or chills.  He has a history of 

diverticulitis with perforation but this feels different.  Patient has no other 

complaints at this time including shortness of breath, headache, or visual 

changes.





- Related Data


                                Home Medications











 Medication  Instructions  Recorded  Confirmed


 


No Known Home Medications  07/03/18 07/03/18











                                    Allergies











Allergy/AdvReac Type Severity Reaction Status Date / Time


 


levofloxacin [From Levaquin] Allergy  Rash/Hives Verified 05/12/23 08:17














Review of Systems


ROS Statement: 


Those systems with pertinent positive or pertinent negative responses have been 

documented in the HPI.





ROS Other: All systems not noted in ROS Statement are negative.





Past Medical History


Past Medical History: Cancer, GERD/Reflux, Hypertension, Osteoarthritis (OA)


Additional Past Medical History / Comment(s): skin cancer on nose , 

diverticulitis(past perforation-2016),past broken lt wrist(has pins in place)


History of Any Multi-Drug Resistant Organisms: None Reported


Past Surgical History: Cholecystectomy, Orthopedic Surgery


Additional Past Surgical History / Comment(s): LEFT WRIST pins in place , x3 sx 

for basl cell skin cancer rt nasal/cheek w/ skin flap,  4-17-16 diagnostic 

laproscopic and drainage of abcess d/t perforated diverticula


Past Anesthesia/Blood Transfusion Reactions: No Reported Reaction


Additional Past Anesthesia/Blood Transfusion Reaction / Comment(s): has never 

recieved blood


Past Psychological History: Anxiety


Smoking Status: Current every day smoker


Past Alcohol Use History: Occasional


Past Drug Use History: None Reported





- Past Family History


  ** Mother


Family Medical History: No Reported History


Additional Family Medical History / Comment(s): pt was raised by his grandmother

does'nt know anything about his mom or dad





General Exam


Limitations: no limitations


General appearance: alert, in no apparent distress


Head exam: Present: atraumatic


Eye exam: Present: normal appearance, scleral icterus (slight)


ENT exam: Present: mucous membranes dry


Neck exam: Present: normal inspection, full ROM.  Absent: tenderness


Respiratory exam: Present: normal lung sounds bilaterally.  Absent: respiratory 

distress, wheezes


Cardiovascular Exam: Present: regular rate, normal rhythm, normal heart sounds


GI/Abdominal exam: Present: distended, tenderness (epigastric and suprapubic 

tenderness), normal bowel sounds.  Absent: guarding, rebound, rigid


Neurological exam: Present: alert





Course


                                   Vital Signs











  05/12/23 05/12/23





  05:12 07:54


 


Temperature 97.9 F 


 


Pulse Rate 107 H 98


 


Respiratory 18 18





Rate  


 


Blood Pressure 150/87 157/108


 


O2 Sat by Pulse 98 93 L





Oximetry  














EKG Findings





- EKG Comments:


EKG Findings:: Sinus tachycardia, ventricular rate 107, HI interval 156, QTC 

430, ST depression in lateral leads, II, aVF





Medical Decision Making





- Medical Decision Making





Vitals are stable.  Patient with mild distress secondary to pain.  Physical exam

does reveal abdominal tenderness and distention.  CBC relatively unremarkable.  

CMP shows hypokalemia and hypomagnesemia which are replaced.  EKG with diffuse 

ST depressions likely related to hypokalemia.  Mildly elevated troponin at 0.073

which will be trended and cardiology will be consulted.  Total bili is 4.7 with 

transaminitis and a lipase of 713.  CT abdomen and pelvis with nonspecific 

findings including a possible cirrhosis, nonspecific enterocolitis, and there he

pancreatic fluid.  Patient alcohol is currently 219.  This case with sound 

physicians.  Patient will be admitted for further management.





Was pt. sent in by a medical professional or institution (, PA, NP, urgent 

care, hospital, or nursing home...) When possible be specific


@  -No


Did you speak to anyone other than the patient for history (EMS, parent, family,

police, friend...)? What history was obtained from this source 


@  -no


Did you review nursing and triage notes (agree or disagree)?  Why? 


@  -[I reviewed and agree with nursing and triage notes]


Were old charts reviewed (outside hosp., previous admission, EMS record, old 

EKG, old radiological studies, urgent care reports/EKG's, nursing home records)?

Report findings 


@  -previous admissions reviewed


Differential Diagnosis (chest pain, altered mental status, abdominal pain women,

abdominal pain men, vaginal bleeding, weakness, fever, dyspnea, syncope, he

adache, dizziness, GI bleed, back pain, seizure, CVA, palpatations, mental 

health)? 


@  -Differential Abdominal Pain Men:


Appendicitis, cholecystitis, diverticulosis, ischemic bowel, pancreatitis, 

hepatitis, UTI, gastroenteritis, AAA, incarcerated hernia, bowel obstruction, 

constipation, inflammatory bowel, hepatitis, peptic ulcer disease, splenic 

infarction, perforated viscus, testicular torsion, this is not meant to be an 

all-inclusive list


EKG interpreted by me (3pts min.).


@  -[As above]


X-rays interpreted by me (1pt min.).


@  -CXR


CT interpreted by me (1pt min.).


@  -CT abdomen and pelvis


U/S interpreted by me (1pt. min.).


@  -[None done]


What testing was considered but not performed or refused? (CT, X-rays, U/S, 

labs)? Why?


@  -US, h/o cholecystectomy


What meds were considered but not given or refused? Why?


@  -Morphine, biliary dyskinesia


Did you discuss the management of the patient with other professionals 

(professionals i.e. , PA, NP, lab, RT, psych nurse, , , 

teacher, , )? Give summary


@  -Dr Yates,  physician 


Was smoking cessation discussed for >3mins.?


@  -[No]


Was critical care preformed (if so, how long)?


@  -yes, 35 minutes


Were there social determinants of health that impacted care today? How? 

(Homelessness, low income, unemployed, alcoholism, drug addiction, 

transportation, low edu. Level, literacy, decrease access to med. care, correction, 

rehab)?


@  -yes, alcoholism


Was there de-escalation of care discussed even if they declined (Discuss DNR or 

withdrawal of care, Hospice)? DNR status


@  -[No]


What co-morbidities impacted this encounter? (DM, HTN, Smoking, COPD, CAD, 

Cancer, CVA, ARF, Chemo, Hep., AIDS, mental health diagnosis, sleep apnea, 

morbid obesity)?


@  -alcoholism


Was patient admitted / discharged? Hospital course, mention meds given and 

route, prescriptions, significant lab abnormalities, going to OR and other per

tinent info.


@  -Vitals are stable.  Patient with mild distress secondary to pain.  Physical 

exam does reveal abdominal tenderness and distention.  CBC relatively unrema

rkable.  CMP shows hypokalemia and hypomagnesemia which are replaced.  EKG with 

diffuse ST depressions likely related to hypokalemia.  Mildly elevated troponin 

at 0.073 which will be trended and cardiology will be consulted.  Total bili is 

4.7 with transaminitis and a lipase of 713.  CT abdomen and pelvis with 

nonspecific findings including a possible cirrhosis, nonspecific enterocolitis, 

and there he pancreatic fluid.  Patient alcohol is currently 219.  This case 

with sound physicians.  Patient will be admitted for further management.


Undiagnosed new problem with uncertain prognosis?


@  -yes


Drug Therapy requiring intensive monitoring for toxicity (Heparin, Nitro, 

Insulin, Cardizem)?


@  -[No]


Were any procedures done?


@  -[No]


Diagnosis/symptom?


@  - hypokalemia, hypomagnesemia, EKG ST depressions, elevated troponin, 

transaminitis, pancreatitis, enterocolitis, cirrhosis, alcohol intoxication


Acute, or Chronic, or Acute on Chronic?


@  -acute


Uncomplicated (without systemic symptoms) or Complicated (systemic symptoms)?


@  -complicated


Side effects of treatment?


@  -[No]


Exacerbation, Progression, or Severe Exacerbation?


@  -[No]


Poses a threat to life or bodily function? How? (Chest pain, USA, MI, pneumonia,

 PE, COPD, DKA, ARF, appy, cholecystitis, CVA, Diverticulitis, Homicidal, 

Suicidal, threat to staff... and all critical care pts)


@  -yes, pancratitis, hypokalemia





- Lab Data


Result diagrams: 


                                 05/12/23 05:38





                                 05/12/23 05:38


                                   Lab Results











  05/12/23 05/12/23 05/12/23 Range/Units





  05:38 05:38 05:38 


 


WBC  6.6    (3.8-10.6)  k/uL


 


RBC  3.98 L    (4.30-5.90)  m/uL


 


Hgb  13.8    (13.0-17.5)  gm/dL


 


Hct  39.7    (39.0-53.0)  %


 


MCV  99.6    (80.0-100.0)  fL


 


MCH  34.6    (25.0-35.0)  pg


 


MCHC  34.7    (31.0-37.0)  g/dL


 


RDW  13.7    (11.5-15.5)  %


 


Plt Count  95 L    (150-450)  k/uL


 


MPV  9.1    


 


Neutrophils %  66    %


 


Lymphocytes %  22    %


 


Monocytes %  6    %


 


Eosinophils %  1    %


 


Basophils %  1    %


 


Neutrophils #  4.4    (1.3-7.7)  k/uL


 


Lymphocytes #  1.5    (1.0-4.8)  k/uL


 


Monocytes #  0.4    (0-1.0)  k/uL


 


Eosinophils #  0.1    (0-0.7)  k/uL


 


Basophils #  0.1    (0-0.2)  k/uL


 


Manual Slide Review  Performed    


 


Poikilocytosis (manual  Present    


 


PT     (9.0-12.0)  sec


 


INR     (<1.2)  


 


APTT     (22.0-30.0)  sec


 


Sodium   134 L   (137-145)  mmol/L


 


Potassium   2.0 L*   (3.5-5.1)  mmol/L


 


Chloride   89 L   ()  mmol/L


 


Carbon Dioxide   31 H   (22-30)  mmol/L


 


Anion Gap   14   mmol/L


 


BUN   5 L   (9-20)  mg/dL


 


Creatinine   0.73   (0.66-1.25)  mg/dL


 


Est GFR (CKD-EPI)AfAm   >90   (>60 ml/min/1.73 sqM)  


 


Est GFR (CKD-EPI)NonAf   >90   (>60 ml/min/1.73 sqM)  


 


Glucose   118 H   (74-99)  mg/dL


 


Calcium   7.6 L   (8.4-10.2)  mg/dL


 


Magnesium     (1.6-2.3)  mg/dL


 


Total Bilirubin   4.7 H   (0.2-1.3)  mg/dL


 


AST   493 H   (17-59)  U/L


 


ALT   112 H   (4-49)  U/L


 


Alkaline Phosphatase   257 H   ()  U/L


 


Troponin I    0.073 H*  (0.000-0.034)  ng/mL


 


Total Protein   8.4 H   (6.3-8.2)  g/dL


 


Albumin   3.2 L   (3.5-5.0)  g/dL


 


Amylase   97   ()  U/L


 


Lipase   713 H   ()  U/L


 


Urine Color     


 


Urine Appearance     (Clear)  


 


Urine pH     (5.0-8.0)  


 


Ur Specific Gravity     (1.001-1.035)  


 


Urine Protein     (Negative)  


 


Urine Glucose (UA)     (Negative)  


 


Urine Ketones     (Negative)  


 


Urine Blood     (Negative)  


 


Urine Nitrite     (Negative)  


 


Urine Bilirubin     (Negative)  


 


Urine Urobilinogen     (<2.0)  mg/dL


 


Ur Leukocyte Esterase     (Negative)  


 


Serum Alcohol   219 H*   mg/dL














  05/12/23 05/12/23 05/12/23 Range/Units





  05:38 07:41 07:44 


 


WBC     (3.8-10.6)  k/uL


 


RBC     (4.30-5.90)  m/uL


 


Hgb     (13.0-17.5)  gm/dL


 


Hct     (39.0-53.0)  %


 


MCV     (80.0-100.0)  fL


 


MCH     (25.0-35.0)  pg


 


MCHC     (31.0-37.0)  g/dL


 


RDW     (11.5-15.5)  %


 


Plt Count     (150-450)  k/uL


 


MPV     


 


Neutrophils %     %


 


Lymphocytes %     %


 


Monocytes %     %


 


Eosinophils %     %


 


Basophils %     %


 


Neutrophils #     (1.3-7.7)  k/uL


 


Lymphocytes #     (1.0-4.8)  k/uL


 


Monocytes #     (0-1.0)  k/uL


 


Eosinophils #     (0-0.7)  k/uL


 


Basophils #     (0-0.2)  k/uL


 


Manual Slide Review     


 


Poikilocytosis (manual     


 


PT   15.5 H   (9.0-12.0)  sec


 


INR   1.5 H   (<1.2)  


 


APTT   31.0 H   (22.0-30.0)  sec


 


Sodium     (137-145)  mmol/L


 


Potassium     (3.5-5.1)  mmol/L


 


Chloride     ()  mmol/L


 


Carbon Dioxide     (22-30)  mmol/L


 


Anion Gap     mmol/L


 


BUN     (9-20)  mg/dL


 


Creatinine     (0.66-1.25)  mg/dL


 


Est GFR (CKD-EPI)AfAm     (>60 ml/min/1.73 sqM)  


 


Est GFR (CKD-EPI)NonAf     (>60 ml/min/1.73 sqM)  


 


Glucose     (74-99)  mg/dL


 


Calcium     (8.4-10.2)  mg/dL


 


Magnesium  1.3 L    (1.6-2.3)  mg/dL


 


Total Bilirubin     (0.2-1.3)  mg/dL


 


AST     (17-59)  U/L


 


ALT     (4-49)  U/L


 


Alkaline Phosphatase     ()  U/L


 


Troponin I     (0.000-0.034)  ng/mL


 


Total Protein     (6.3-8.2)  g/dL


 


Albumin     (3.5-5.0)  g/dL


 


Amylase     ()  U/L


 


Lipase     ()  U/L


 


Urine Color    Yellow  


 


Urine Appearance    Clear  (Clear)  


 


Urine pH    6.5  (5.0-8.0)  


 


Ur Specific Gravity    1.016  (1.001-1.035)  


 


Urine Protein    Negative  (Negative)  


 


Urine Glucose (UA)    Negative  (Negative)  


 


Urine Ketones    Negative  (Negative)  


 


Urine Blood    Negative  (Negative)  


 


Urine Nitrite    Negative  (Negative)  


 


Urine Bilirubin    Negative  (Negative)  


 


Urine Urobilinogen    3.0  (<2.0)  mg/dL


 


Ur Leukocyte Esterase    Negative  (Negative)  


 


Serum Alcohol     mg/dL














Disposition


Clinical Impression: 


 Pancreatitis, Transaminitis, Hypokalemia, Hypomagnesemia, Alcohol intoxication





Disposition: ADMITTED AS IP TO THIS HOSP


Is patient prescribed a controlled substance at d/c from ED?: No


Referrals: 


None,Stated [Primary Care Provider] - 1-2 days


Time of Disposition: 08:23

## 2023-05-12 NOTE — P.HPIM
History of Present Illness


H&P Date: 05/12/23





Patient is a 39-year-old male with PMH of hypertension, diverticulitis with 

perforation, GERD, alcohol abuse that presents the ED for not feeling well and 

abdominal pain.  Patient is a poor historian and is unable to describe his 

abdominal pain effectively.  He reports pain that has been ongoing for the past 

week.  He also reports decreased appetite, barely has had anything to eat over 

the past 5 days.  He also reports a vague chest pain but is unable to describe 

his pain effectively.  His last drink was yesterday.  He denies any history of 

alcohol-induced seizures.  The above symptoms prompted him to come to the ED.  

He currently denies any headache, lower extremity edema, fever or chills, cough,

chest pain, shortness of breath, palpitations, changes in urination or bowel 

habits.  He denies any dizziness, numbness/weakness/tingling of the extremities.

 In the ED, he was noted to be tachycardic with heart rate in the 100s.  Vital 

signs were otherwise stable.  CBC showed RBC count of 3.98 and platelet count 

95.  Coagulation panel showed INR of 1.5.  CMP showed sodium 134, potassium 2, 

chloride of 89, bicarb of 31, BUN of 5, glucose of 118, calcium of 76, magnesium

of 1.3, total bilirubin of 4.7, AST of 493, ALT of 112, alkaline phosphatase of 

257, albumin of 3.2.  Lipase elevated at 713.  Troponin 0.073, 0.052, 0.056.  

EKG showed sinus tachycardia with T-wave inversions, ventricular rate of 107.  

Urinalysis negative.  Serum alcohol 219.  Chest x-ray showed right hemidiaphragm

elevation.  CT AP showed micronodules in the liver, wall thickening of the 

sigmoid colon bilateral nonobstructing renal calculi, peripancreatic fat 

stranding.  Patient is admitted for further management.





Pertinent positives and negatives as discussed in HPI, a complete review of 

systems was performed and all other systems are negative.





General: non toxic, no distress, appears at stated age


Derm: warm, dry


Head: atraumatic, normocephalic, symmetric


Eyes: EOMI, no lid lag, anicteric sclera


Mouth: no lip lesion, mucus membranes moist


Cardiovascular: Tachycardic, no murmur


Lungs: CTA bilateral, no rhonchi, no rales , no accessory muscle use


Abdominal: soft,  nontender to palpation, no guarding, no appreciable 

organomegaly


Ext: no gross muscle atrophy, no edema, no contractures


Neuro:  no focal neuro deficits


Psych: Alert, oriented, appropriate affect 





Acute pancreatitis


Troponin elevation


Severe hypokalemia


Alcohol intoxication with impending withdrawal


Obstructive transaminitis


Supratherapeutic INR


Hypomagnesemia


Thrombocytopenia





Based on my assessment of this patient, this patient meets a high complexity 

level of care.





Patient has an acute diagnosis of acute pancreatitis with troponin elevation and

severe hypokalemia that poses a threat to life or bodily function. CT AP shows 

wall thickening of the sigmoid colon along with peripancreatic fat stranding.  





Patient will be placed nothing by mouth and given Dilaudid 0.5 mg IV every 3 

hours as needed for pain.  Start normal saline at 130 mL per hour.





Troponins are elevated but remained flat.  Cardiology recommends possible stress

test after medically stable.  Continue telemetry monitoring.





Potassium of 2 replaced by mouth and intravenously with potassium chloride.  

Repeat BMP tomorrow morning.





Magnesium of 1.3 replaced intravenously with magnesium sulfate.  Repeat 

magnesium level tomorrow morning.





Obstructive transaminitis, thrombocytopenia likely related to chronic alcohol 

abuse.  Repeat CMP tomorrow morning.





Continue CIWA protocol and give Ativan as needed for alcohol withdrawal.





I have reviewed the following consultant notes:


None.





I have reviewed the results of the following tests:


CBC showed RBC count of 3.98 and platelet count 95.  Coagulation panel showed 

INR of 1.5.  CMP showed sodium 134, potassium 2, chloride of 89, bicarb of 31, 

BUN of 5, glucose of 118, calcium of 76, magnesium of 1.3, total bilirubin of 

4.7, AST of 493, ALT of 112, alkaline phosphatase of 257, albumin of 3.2.  

Lipase elevated at 713.  Troponin 0.073, 0.052, 0.056.  Urinalysis negative.  

Serum alcohol 219. CT AP showed micronodules in the liver, wall thickening of 

the sigmoid colon bilateral nonobstructing renal calculi, peripancreatic fat 

stranding. 





I have ordered the following tests:


Echocardiogram ordered.  Repeat CMP ordered for tomorrow morning.





I have discussed the care of this patient with the following independent 

historian:


None.





I have independently interpreted the following test below:


Chest x-ray showed right hemidiaphragm elevation. EKG showed sinus tachycardia 

with T-wave inversions.





I have discussed the management of this patient with the following physician:


The case is discussed with the ED physician decision made to admit the patient 

for elevated troponin and acute pancreatitis with severe electrolyte 

abnormalities.





This patient has a high risk of morbidity due to the following reasons:





Patient requires IV Dilaudid for pain control.





Past Medical History


Past Medical History: Cancer, GERD/Reflux, Hypertension, Osteoarthritis (OA)


Additional Past Medical History / Comment(s): skin cancer on nose , 

diverticulitis(past perforation-2016),past broken lt wrist(has pins in place)


History of Any Multi-Drug Resistant Organisms: None Reported


Past Surgical History: Cholecystectomy, Orthopedic Surgery


Additional Past Surgical History / Comment(s): LEFT WRIST pins in place , x3 sx 

for basl cell skin cancer rt nasal/cheek w/ skin flap,  4-17-16 diagnostic 

laproscopic and drainage of abcess d/t perforated diverticula


Past Anesthesia/Blood Transfusion Reactions: No Reported Reaction


Additional Past Anesthesia/Blood Transfusion Reaction / Comment(s): has never 

recieved blood


Past Psychological History: Anxiety


Smoking Status: Current every day smoker


Past Alcohol Use History: Occasional


Past Drug Use History: None Reported





- Past Family History


  ** Mother


Family Medical History: No Reported History


Additional Family Medical History / Comment(s): pt was raised by his grandmother

does'nt know anything about his mom or dad





Medications and Allergies


                                Home Medications











 Medication  Instructions  Recorded  Confirmed  Type


 


No Known Home Medications  07/03/18 05/12/23 History








                                    Allergies











Allergy/AdvReac Type Severity Reaction Status Date / Time


 


levofloxacin [From Levaquin] Allergy  Rash/Hives Verified 05/12/23 08:17














Physical Exam


Vitals: 


                                   Vital Signs











  Temp Pulse Resp BP Pulse Ox


 


 05/12/23 14:45   95  18  158/110  98


 


 05/12/23 13:46      97


 


 05/12/23 13:45   89  18  138/79  90 L


 


 05/12/23 11:41   89  18  148/100  96


 


 05/12/23 10:00   92  18  160/104  96


 


 05/12/23 09:37   95  18  150/96  98


 


 05/12/23 07:54   98  18  157/108  93 L


 


 05/12/23 05:12  97.9 F  107 H  18  150/87  98








                                Intake and Output











 05/12/23 05/12/23 05/12/23





 06:59 14:59 22:59


 


Other:   


 


  Weight 79.379 kg  














Results


CBC & Chem 7: 


                                 05/12/23 05:38





                                 05/12/23 13:30


Labs: 


                  Abnormal Lab Results - Last 24 Hours (Table)











  05/12/23 05/12/23 05/12/23 Range/Units





  05:38 05:38 05:38 


 


RBC  3.98 L    (4.30-5.90)  m/uL


 


Plt Count  95 L    (150-450)  k/uL


 


PT     (9.0-12.0)  sec


 


INR     (<1.2)  


 


APTT     (22.0-30.0)  sec


 


Sodium   134 L   (137-145)  mmol/L


 


Potassium   2.0 L*   (3.5-5.1)  mmol/L


 


Chloride   89 L   ()  mmol/L


 


Carbon Dioxide   31 H   (22-30)  mmol/L


 


BUN   5 L   (9-20)  mg/dL


 


Glucose   118 H   (74-99)  mg/dL


 


Calcium   7.6 L   (8.4-10.2)  mg/dL


 


Magnesium     (1.6-2.3)  mg/dL


 


Total Bilirubin   4.7 H   (0.2-1.3)  mg/dL


 


Conjugated Bilirubin     (0.0-0.3)  mg/dL


 


Unconjugated Bilirubin     (0.0-1.1)  mg/dL


 


Delta Bilirubin     (0.0-0.2)  mg/dL


 


AST   493 H   (17-59)  U/L


 


ALT   112 H   (4-49)  U/L


 


Alkaline Phosphatase   257 H   ()  U/L


 


Troponin I    0.073 H*  (0.000-0.034)  ng/mL


 


Total Protein   8.4 H   (6.3-8.2)  g/dL


 


Albumin   3.2 L   (3.5-5.0)  g/dL


 


Lipase   713 H   ()  U/L


 


Serum Alcohol   219 H*   mg/dL














  05/12/23 05/12/23 05/12/23 Range/Units





  05:38 07:30 07:41 


 


RBC     (4.30-5.90)  m/uL


 


Plt Count     (150-450)  k/uL


 


PT    15.5 H  (9.0-12.0)  sec


 


INR    1.5 H  (<1.2)  


 


APTT    31.0 H  (22.0-30.0)  sec


 


Sodium     (137-145)  mmol/L


 


Potassium     (3.5-5.1)  mmol/L


 


Chloride     ()  mmol/L


 


Carbon Dioxide     (22-30)  mmol/L


 


BUN     (9-20)  mg/dL


 


Glucose     (74-99)  mg/dL


 


Calcium     (8.4-10.2)  mg/dL


 


Magnesium  1.3 L    (1.6-2.3)  mg/dL


 


Total Bilirubin   4.5 H   (0.2-1.3)  mg/dL


 


Conjugated Bilirubin   0.6 H   (0.0-0.3)  mg/dL


 


Unconjugated Bilirubin   2.1 H   (0.0-1.1)  mg/dL


 


Delta Bilirubin   1.8 H   (0.0-0.2)  mg/dL


 


AST     (17-59)  U/L


 


ALT     (4-49)  U/L


 


Alkaline Phosphatase     ()  U/L


 


Troponin I     (0.000-0.034)  ng/mL


 


Total Protein     (6.3-8.2)  g/dL


 


Albumin     (3.5-5.0)  g/dL


 


Lipase     ()  U/L


 


Serum Alcohol     mg/dL














  05/12/23 05/12/23 05/12/23 Range/Units





  10:15 13:30 13:30 


 


RBC     (4.30-5.90)  m/uL


 


Plt Count     (150-450)  k/uL


 


PT     (9.0-12.0)  sec


 


INR     (<1.2)  


 


APTT     (22.0-30.0)  sec


 


Sodium     (137-145)  mmol/L


 


Potassium   3.0 L   (3.5-5.1)  mmol/L


 


Chloride     ()  mmol/L


 


Carbon Dioxide     (22-30)  mmol/L


 


BUN     (9-20)  mg/dL


 


Glucose     (74-99)  mg/dL


 


Calcium     (8.4-10.2)  mg/dL


 


Magnesium     (1.6-2.3)  mg/dL


 


Total Bilirubin     (0.2-1.3)  mg/dL


 


Conjugated Bilirubin     (0.0-0.3)  mg/dL


 


Unconjugated Bilirubin     (0.0-1.1)  mg/dL


 


Delta Bilirubin     (0.0-0.2)  mg/dL


 


AST     (17-59)  U/L


 


ALT     (4-49)  U/L


 


Alkaline Phosphatase     ()  U/L


 


Troponin I  0.052 H*   0.056 H*  (0.000-0.034)  ng/mL


 


Total Protein     (6.3-8.2)  g/dL


 


Albumin     (3.5-5.0)  g/dL


 


Lipase     ()  U/L


 


Serum Alcohol     mg/dL

## 2023-05-12 NOTE — CONS
CONSULTATION



CHIEF COMPLAINT:

Abdominal pain.



HISTORY OF PRESENT ILLNESS:

This is a 47-year-old gentleman with no significant past medical history, has

significant EtOH abuse, who presented to hospital with severe hypokalemia, elevated

liver enzymes and mild elevation in troponin.  He has coagulopathy with an INR of 1.5.

He had ST-T wave changes on his EKG.  He does not have any chest pain.  We were

consulted because of the elevated troponin.  His alcohol level is elevated at 219.  The

patient denies any chest pain or difficulty in breathing.  His abdominal pain is

improving since being admitted.  His lipase is elevated at 713.  There is no prior

history of coronary artery disease or congestive heart failure.  I believe his clinical

presentation is all related to EtOH intoxication and alcohol abuse.  I advised the

patient to quit drinking and I will obtain a 2D echo to assess his LV function and wall

motion.  When alcohol-related issues resolve, we might consider performing a stress

test on him.



PAST MEDICAL HISTORY:

Negative for hypertension, diabetes, dyslipidemia.



MEDICATIONS:

None.



ALLERGIES:

Levaquin.



FAMILY HISTORY:

Negative for premature coronary artery disease.



SOCIAL HISTORY:

Significant for smoking and EtOH abuse.  There is no history of drug abuse.



REVIEW OF SYSTEMS:

A 14/14 review of systems has been performed.  Pertinents are as documented.



PHYSICAL EXAMINATION:

GENERAL:  On exam, comfortable at rest.

VITAL SIGNS:  Heart rate is 80 beats per minute, blood pressure is 148/100, respiratory

rate 18.

CHEST:  Reveals diminished air entry at the bases.

HEART:  Reveals first and second heart sounds.  No gallop.

ABDOMEN:  Soft.

MUSCULOSKELETAL:  Exam of extremities did not reveal any edema.  Peripheral pulses are

felt.



ASSESSMENT:

1. Pancreatitis.

2. Severe hypokalemia.

3. EtOH abuse with intoxication.

4. _____.

5. Elevated troponin.



PLAN:

The patient's clinical presentation is all related to his alcohol abuse.  I will obtain

a 2D echo.  Continue current medications.  I will add beta blockers for blood pressure

control.





MMODL / IJN: [id number] / Job#: [id number]

## 2023-05-13 VITALS
DIASTOLIC BLOOD PRESSURE: 104 MMHG | HEART RATE: 92 BPM | RESPIRATION RATE: 16 BRPM | TEMPERATURE: 97.8 F | SYSTOLIC BLOOD PRESSURE: 184 MMHG

## 2023-05-13 LAB
ALBUMIN SERPL-MCNC: 2.9 G/DL (ref 3.5–5)
ALP SERPL-CCNC: 245 U/L (ref 38–126)
ALT SERPL-CCNC: 101 U/L (ref 4–49)
ANION GAP SERPL CALC-SCNC: 10 MMOL/L
AST SERPL-CCNC: 494 U/L (ref 17–59)
BUN SERPL-SCNC: 7 MG/DL (ref 9–20)
CALCIUM SPEC-MCNC: 6.8 MG/DL (ref 8.4–10.2)
CHLORIDE SERPL-SCNC: 98 MMOL/L (ref 98–107)
CHOLEST SERPL-MCNC: 114 MG/DL (ref 0–200)
CO2 SERPL-SCNC: 29 MMOL/L (ref 22–30)
ERYTHROCYTE [DISTWIDTH] IN BLOOD BY AUTOMATED COUNT: 4.03 M/UL (ref 4.3–5.9)
ERYTHROCYTE [DISTWIDTH] IN BLOOD: 14 % (ref 11.5–15.5)
GLUCOSE SERPL-MCNC: 116 MG/DL (ref 74–99)
HCT VFR BLD AUTO: 43.3 % (ref 39–53)
HDLC SERPL-MCNC: 13.7 MG/DL (ref 40–60)
HGB BLD-MCNC: 13.8 GM/DL (ref 13–17.5)
LDLC SERPL CALC-MCNC: 75.1 MG/DL (ref 0–131)
MCH RBC QN AUTO: 34.3 PG (ref 25–35)
MCHC RBC AUTO-ENTMCNC: 31.9 G/DL (ref 31–37)
MCV RBC AUTO: 107.5 FL (ref 80–100)
PLATELET # BLD AUTO: 81 K/UL (ref 150–450)
POTASSIUM SERPL-SCNC: 2.7 MMOL/L (ref 3.5–5.1)
PROT SERPL-MCNC: 7.9 G/DL (ref 6.3–8.2)
SODIUM SERPL-SCNC: 137 MMOL/L (ref 137–145)
TRIGL SERPL-MCNC: 126 MG/DL (ref 0–149)
VLDLC SERPL CALC-MCNC: 25.2 MG/DL (ref 5–40)
WBC # BLD AUTO: 5.2 K/UL (ref 3.8–10.6)

## 2023-05-13 RX ADMIN — CEFAZOLIN SCH MLS/HR: 330 INJECTION, POWDER, FOR SOLUTION INTRAMUSCULAR; INTRAVENOUS at 08:57

## 2023-05-13 RX ADMIN — HYDROMORPHONE HYDROCHLORIDE PRN MG: 1 INJECTION, SOLUTION INTRAMUSCULAR; INTRAVENOUS; SUBCUTANEOUS at 02:54

## 2023-05-13 NOTE — P.DS
Providers


Date of admission: 


05/12/23 08:32





Expected date of discharge: 05/13/23


Attending physician: 


Carlos Olivares MD





Consults: 





                                        





05/12/23 08:26


Consult Physician Routine 


   Consulting Provider: [facility]


   Consult Reason/Comments: elevated troponin, ST depression, hypokalemia


   Do you want consulting provider notified?: Yes











Primary care physician: 


Stated None





Hospital Course: 





Patient is a 39-year-old male with PMH of hypertension, diverticulitis with 

perforation, GERD, alcohol abuse that presents the ED for not feeling well and 

abdominal pain.  Patient is a poor historian and is unable to describe his 

abdominal pain effectively.  He reports pain that has been ongoing for the past 

week.  He also reports decreased appetite, barely has had anything to eat over t

he past 5 days.  He also reports a vague chest pain but is unable to describe 

his pain effectively.  His last drink was yesterday.  He denies any history of 

alcohol-induced seizures.  The above symptoms prompted him to come to the ED.  

He currently denies any headache, lower extremity edema, fever or chills, cough,

chest pain, shortness of breath, palpitations, changes in urination or bowel 

habits.  He denies any dizziness, numbness/weakness/tingling of the extremities.

 In the ED, he was noted to be tachycardic with heart rate in the 100s.  Vital 

signs were otherwise stable.  CBC showed RBC count of 3.98 and platelet count 

95.  Coagulation panel showed INR of 1.5.  CMP showed sodium 134, potassium 2, 

chloride of 89, bicarb of 31, BUN of 5, glucose of 118, calcium of 76, magnesium

of 1.3, total bilirubin of 4.7, AST of 493, ALT of 112, alkaline phosphatase of 

257, albumin of 3.2.  Lipase elevated at 713.  Troponin 0.073, 0.052, 0.056.  

EKG showed sinus tachycardia with T-wave inversions, ventricular rate of 107.  

Urinalysis negative.  Serum alcohol 219.  Chest x-ray showed right hemidiaphragm

elevation.  CT AP showed micronodules in the liver, wall thickening of the 

sigmoid colon bilateral nonobstructing renal calculi, peripancreatic fat 

stranding.  Patient is admitted for further management.  His potassium and 

magnesium was replaced.  His troponins remained flat, cardiology was consulted 

and recommended stress test when patient was medically stable.  He was given 

Dilaudid as needed for abdominal pain along with Ativan as needed for alcohol 

withdrawal.  Patient left AGAINST MEDICAL ADVICE on 5/13.





See progress note for physical exam.





Pertinent studies include chest x-ray, CT AP, echocardiogram.





Discharge diagnosis:





Acute pancreatitis


Troponin elevation


Severe hypokalemia


Alcohol intoxication with impending withdrawal


Obstructive transaminitis


Supratherapeutic INR


Thrombocytopenia


Resolved: HypoMg


Patient Condition at Discharge: Critical





Plan - Discharge Summary


New Discharge Prescriptions: 


No Action


   No Known Home Medications 


Discharge Medication List





No Known Home Medications  07/03/18 [History]








Follow up Appointment(s)/Referral(s): 


None,Stated [Primary Care Provider] - 1-2 days

## 2023-05-13 NOTE — CA
Transthoracic Echo Report 

 Name: Benny De León 

 MRN:    [id number] 

 Age:    47     Gender:     M 

 

 :    1975 

 Exam Date:     2023 13:02 

 Exam Location: [facility] 

 Ht (in):     66     Wt (lb):     175 

 Ordering Physician:        Jem Alvarado MD  

                            ([id number]) 

 Attending/Referring Phys:         Jenny GUERRA 

 Technician         Sirena Saenz RDCS 

 Procedure CPT: 

 Indications:       elevated trop 

 

 Cardiac Hx: 

 Technical Quality:      Good 

 Contrast 1:                                Total Dose (mL): 

 Contrast 2:                                Total Dose (mL): 

 

 MEASUREMENTS  (Male / Female) Normal Values 

 2D ECHO 

 LV Diastolic Diameter PLAX        4.7 cm                4.2 - 5.9 / 3.9 - 5.3 cm 

 LV Systolic Diameter PLAX         3.1 cm                 

 IVS Diastolic Thickness           1.2 cm                0.6 - 1.0 / 0.6 - 0.9 cm 

 LVPW Diastolic Thickness          1.1 cm                0.6 - 1.0 / 0.6 - 0.9 cm 

 LV Relative Wall Thickness        0.5                    

 RV Internal Dim ED PLAX           3.5 cm                 

 LA Systolic Diameter LX           3.1 cm                3.0 - 4.0 / 2.7 - 3.8 cm 

 LA Volume                         34.3 cm???              18 - 58 / 22 - 52 cm??? 

 

 M-MODE 

 Aortic Root Diameter MM           3.9 cm                 

 MV E Point Septal Separation      0.5 cm                 

 AV Cusp Separation MM             2.0 cm                 

 

 DOPPLER 

 AV Peak Velocity                  188.0 cm/s             

 AV Peak Gradient                  14.1 mmHg              

 MV Area PHT                       3.7 cm???                

 Mitral E Point Velocity           86.3 cm/s              

 Mitral A Point Velocity           82.0 cm/s              

 Mitral E to A Ratio               1.1                    

 MV Deceleration Time              205.3 ms               

 MV E' Velocity                    6.7 cm/s               

 Mitral E to MV E' Ratio           12.8                   

 TR Peak Velocity                  212.0 cm/s             

 TR Peak Gradient                  18.0 mmHg              

 Right Ventricular Systolic Press  23.0 mmHg              

 

 

 FINDINGS 

 Left Ventricle 

 Left ventricular ejection fraction is estimated at 55-60 %. Left ventricular  

 cavity size normal.  Mildly increased septal wall thickness. Normal left  

 ventricular wall motion. 

 

 Right Ventricle 

 Mild right ventricular dilatation. Right ventricular systolic pressure within  

 normal limits. 

 

 Right Atrium 

 Normal right atrial size. 

 

 Left Atrium 

 Normal left atrial size. 

 

 Mitral Valve 

 Structurally normal mitral valve. No mitral stenosis, regurgitation or  

 prolapse. 

 

 Aortic Valve 

 Trileaflet aortic valve. No aortic valve stenosis or regurgitation. 

 

 Tricuspid Valve 

 Structurally normal tricuspid valve. Trace tricuspid regurgitation. 

 

 Pulmonic Valve 

 Structurally normal pulmonic valve. Mild pulmonic regurgitation. 

 

 Pericardium 

 Normal pericardium. No pericardial effusion. 

 

 Aorta 

 Mild aortic dilatation at the level of the sinuses of valsalva 39 mm 

 

 CONCLUSIONS 

 Left ventricular ejection fraction 55-60% 

 Mild increased left ventricular wall thickness 

 Trace tricuspid regurgitation 

 No pericardial effusion 

 Previewed by:  

 Dr. Juan C Waters DO 

 (Electronically Signed) 

 Final Date:      13 May 2023 01:25

## 2023-05-13 NOTE — P.PN
Subjective


Progress Note Date: 05/13/23


The patient is a 47-year-old male who is currently admitted to the hospital with

acute alcohol intoxication and pancreatitis.  Cardiology was consulted for 

mildly elevated troponins that are of a flat trend.  Patient is also had severe 

hypokalemia, which is been supplemented per protocol.





Patient was interviewed and examined resting comfortably in bed.  He denies any 

cardiac symptoms.





GENERAL: Well-appearing, well-nourished and in no acute distress.


NECK: Supple without JVD or thyromegaly.


LUNGS: Breath sounds clear to auscultation bilaterally. Respiration equal and 

unlabored.  No wheezes, rales or rhonchi.


HEART: Regular rate and rhythm without murmurs, rubs or gallops. S1 and S2 

heard.


EXTREMITIES: Normal range of motion, no edema.  No clubbing or cyanosis. 

Peripheral pulses intact and strong.





VITALS:


Systolic blood pressure averaging in the 140s to 160s





TELEMETRY:


Sinus rhythm overnight





IMPRESSION:


Acute pancreatitis


Severe hypokalemia


EtOH abuse


Elevated troponins, flat trend


Hypertension





PLAN:


Increase metoprolol to 50 mg twice daily


No further recommendations from the cardiac standpoint


 





I am dictating on behalf of Dr Blayne Clancy's history/physical and 

assessment/plan.








Objective





- Vital Signs


Vital signs: 


                                   Vital Signs











Temp  97.8 F   05/13/23 11:25


 


Pulse  92   05/13/23 11:25


 


Resp  16   05/13/23 11:25


 


BP  184/104   05/13/23 11:25


 


Pulse Ox  96   05/13/23 11:25


 


FiO2      








                                 Intake & Output











 05/12/23 05/13/23 05/13/23





 18:59 06:59 18:59


 


Weight 79.379 kg  


 


Other:   


 


  Voiding Method  Toilet 


 


  # Voids  3 2














- Labs


CBC & Chem 7: 


                                 05/13/23 07:43





                                 05/13/23 07:43


Labs: 


                  Abnormal Lab Results - Last 24 Hours (Table)











  05/12/23 05/12/23 05/13/23 Range/Units





  13:30 13:30 07:43 


 


RBC     (4.30-5.90)  m/uL


 


MCV     (80.0-100.0)  fL


 


Plt Count     (150-450)  k/uL


 


Potassium  3.0 L    (3.5-5.1)  mmol/L


 


BUN     (9-20)  mg/dL


 


Glucose     (74-99)  mg/dL


 


Calcium     (8.4-10.2)  mg/dL


 


Total Bilirubin     (0.2-1.3)  mg/dL


 


AST     (17-59)  U/L


 


ALT     (4-49)  U/L


 


Alkaline Phosphatase     ()  U/L


 


Lactate Dehydrogenase     (120-246)  U/L


 


Troponin I   0.056 H*   (0.000-0.034)  ng/mL


 


Albumin     (3.5-5.0)  g/dL


 


HDL Cholesterol    13.70 L  (40.00-60.00)  mg/dL














  05/13/23 05/13/23 05/13/23 Range/Units





  07:43 07:43 07:43 


 


RBC  4.03 L    (4.30-5.90)  m/uL


 


MCV  107.5 H D    (80.0-100.0)  fL


 


Plt Count  81 L    (150-450)  k/uL


 


Potassium   2.7 L*   (3.5-5.1)  mmol/L


 


BUN   7 L   (9-20)  mg/dL


 


Glucose   116 H   (74-99)  mg/dL


 


Calcium   6.8 L   (8.4-10.2)  mg/dL


 


Total Bilirubin   7.0 H   (0.2-1.3)  mg/dL


 


AST   494 H   (17-59)  U/L


 


ALT   101 H   (4-49)  U/L


 


Alkaline Phosphatase   245 H   ()  U/L


 


Lactate Dehydrogenase    480 H  (120-246)  U/L


 


Troponin I     (0.000-0.034)  ng/mL


 


Albumin   2.9 L   (3.5-5.0)  g/dL


 


HDL Cholesterol     (40.00-60.00)  mg/dL

## 2023-05-13 NOTE — P.PN
Subjective


Progress Note Date: 05/13/23





Patient is a 39-year-old male with PMH of hypertension, diverticulitis with 

perforation, GERD, alcohol abuse that presents the ED for not feeling well and 

abdominal pain.  Patient is a poor historian and is unable to describe his 

abdominal pain effectively.  He reports pain that has been ongoing for the past 

week.  He also reports decreased appetite, barely has had anything to eat over 

the past 5 days.  He also reports a vague chest pain but is unable to describe 

his pain effectively.  His last drink was yesterday.  He denies any history of 

alcohol-induced seizures.  The above symptoms prompted him to come to the ED.  

He currently denies any headache, lower extremity edema, fever or chills, cough,

chest pain, shortness of breath, palpitations, changes in urination or bowel 

habits.  He denies any dizziness, numbness/weakness/tingling of the extremities.

 In the ED, he was noted to be tachycardic with heart rate in the 100s.  Vital 

signs were otherwise stable.  CBC showed RBC count of 3.98 and platelet count 

95.  Coagulation panel showed INR of 1.5.  CMP showed sodium 134, potassium 2, 

chloride of 89, bicarb of 31, BUN of 5, glucose of 118, calcium of 76, magnesium

of 1.3, total bilirubin of 4.7, AST of 493, ALT of 112, alkaline phosphatase of 

257, albumin of 3.2.  Lipase elevated at 713.  Troponin 0.073, 0.052, 0.056.  

EKG showed sinus tachycardia with T-wave inversions, ventricular rate of 107.  

Urinalysis negative.  Serum alcohol 219.  Chest x-ray showed right hemidiaphragm

elevation.  CT AP showed micronodules in the liver, wall thickening of the 

sigmoid colon bilateral nonobstructing renal calculi, peripancreatic fat 

stranding.  Patient is admitted for further management.





5/13 Patient was seen and examined.  He has received 2 mg of IV Ativan along 

with 1.5 mg IV Dilaudid overnight. He reports feeling well. He has no 

complaints. Adamant about wanting to leave AGAINST MEDICAL ADVICE.





General: non toxic, no distress, appears at stated age


Derm: warm, dry


Head: atraumatic, normocephalic, symmetric


Eyes: EOMI, no lid lag, anicteric sclera


Cardiovascular: good distal perfusion in all 4 extremities


Lungs: no accessory muscle use


Ext: no gross muscle atrophy, no edema, no contractures


Neuro:  no focal neuro deficits


Psych: Alert, oriented, appropriate affect 





Acute pancreatitis


Troponin elevation


Severe hypokalemia


Alcohol intoxication with impending withdrawal


Obstructive transaminitis


Supratherapeutic INR


Thrombocytopenia


Resolved: HypoMg





Based on my assessment of this patient, this patient meets a high complexity 

level of care.





Patient has an acute diagnosis of acute pancreatitis with troponin elevation and

severe hypokalemia that poses a threat to life or bodily function. CT AP shows 

wall thickening of the sigmoid colon along with peripancreatic fat stranding.  





Patient will be placed nothing by mouth and given Dilaudid 0.5 mg IV every 3 

hours as needed for pain. He has received 1.5 mg IV Dilaudid overnight.  

Decrease normal saline to 75 mL per hour.





Troponins are elevated but remained flat.  Cardiology recommends possible stress

test after medically stable.  Continue telemetry monitoring.





Potassium of 2.7 will be replaced with KCl 40 meq PO and 40 meq IV.





Obstructive transaminitis, thrombocytopenia likely related to chronic alcohol 

abuse. His Maddrey score is 23.1.  





Continue CIWA protocol and give Ativan as needed for alcohol withdrawal. He has 

received 2 mg of IV Ativan





I have reviewed the following consultant notes:


None.





I have reviewed the results of the following tests:


CBC shows MCV of 107.5 and platelet count of 81.  CMP shows potassium of 2.7, 

BUN of 7, glucose 116, calcium 6.8, total bilirubin of 7, , , 

alkaline phosphatase 245 and albumin of 2.9.  Echocardiogram shows EF of 55-60% 

with trace TR, mild LV wall thickness





I have ordered the following tests:


LDH ordered.  Repeat CMP tomorrow morning.  Liver ultrasound with Doppler 

ordered.





I have discussed the care of this patient with the following independent 

historian:


None.





I have independently interpreted the following test below:


None.





I have discussed the management of this patient with the following physician:


None.





This patient has a high risk of morbidity due to the following reasons:





Patient requires IV Dilaudid for pain control.


Patient required IV Ativan for alcohol withdrawal.





Objective





- Vital Signs


Vital signs: 


                                   Vital Signs











Temp  98.9 F   05/13/23 03:55


 


Pulse  86   05/13/23 03:55


 


Resp  18   05/13/23 03:55


 


BP  146/83   05/13/23 03:55


 


Pulse Ox  98   05/13/23 03:55


 


FiO2      








                                 Intake & Output











 05/12/23 05/13/23 05/13/23





 18:59 06:59 18:59


 


Weight 79.379 kg  


 


Other:   


 


  Voiding Method  Toilet 


 


  # Voids  3 














- Labs


CBC & Chem 7: 


                                 05/13/23 07:43





                                 05/13/23 07:43


Labs: 


                  Abnormal Lab Results - Last 24 Hours (Table)











  05/12/23 05/12/23 05/12/23 Range/Units





  10:15 13:30 13:30 


 


Potassium   3.0 L   (3.5-5.1)  mmol/L


 


Troponin I  0.052 H*   0.056 H*  (0.000-0.034)  ng/mL

## 2023-05-17 NOTE — CDI
Documentation Clarification Form



Date: 5/17/2023

From: Wendy Cortés

MRN: [id number]

Admit Date: 5/12/2023 8:32:00 AM

Patient Name: Benny De León

Visit Number: [id number]

Discharge Date:  5/13/2023 12:19:00 PM



ATTENTION: The Clinical Documentation Specialists (CDI) and [facility] Coding Staff 
appreciate your assistance in clarifying documentation. Please respond to the 
clarification below the line at the bottom and electronically sign. The CDI & 
[facility] Coding staff will review the response and follow-up if needed. Please note: 
Queries are made part of the Legal Health Record. If you have any questions, 
please contact the author of this message via ITS.



Dr. Dilma Cordero,



Pancreatitis is documented 5/13 DCS & PN.  

Additional clarification regarding the etiology and acuity of pancreatitis is 
requested.



History/risk factors: GERD, ETOH, Thrombocytopenia, Hypokalemia, & HTN



Clinical Indicators: Elevated Lipase, Troponin, Transaminase 

Radiology:

   5/12 CT Scan: Trace peripancreatic fat stranding and fluid which may 
reactive to # versusacute interstitialedematouspancreatitis



Amylase/Lipase: 5/12 97/713



Treatment[facility] protocol[facility] give Ativan as needed foralcohol withdrawal.
He has

received 2 mg of IV Ativan Medication





Please clarify the acuity and etiology of the pancreatitis, if known:



Acuity

[ x ]  Acute pancreatitis

[  ]  Acute on Chronic pancreatitis

[  ]  Chronic pancreatitis

[  ]  Other, please specify ____

[  ]  Unable to determine



Etiology

[  ]  Alcohol induced pancreatitis

[  ]  Biliary pancreatitis

[  ]  Cytomegaloviral pancreatitis

[  ]  Drug induced pancreatitis

[  ]  Gallstone pancreatitis

[  ]  Idiopathic pancreatitis

[  ]  Other, please specify ______

[  ]  Unable to determine

___________________________________________________________________________

MTDD